# Patient Record
Sex: FEMALE | Race: WHITE
[De-identification: names, ages, dates, MRNs, and addresses within clinical notes are randomized per-mention and may not be internally consistent; named-entity substitution may affect disease eponyms.]

---

## 2020-11-03 ENCOUNTER — HOSPITAL ENCOUNTER (EMERGENCY)
Dept: HOSPITAL 8 - ED | Age: 55
Discharge: HOME | End: 2020-11-03
Payer: OTHER GOVERNMENT

## 2020-11-03 VITALS — SYSTOLIC BLOOD PRESSURE: 146 MMHG | DIASTOLIC BLOOD PRESSURE: 89 MMHG

## 2020-11-03 VITALS — HEIGHT: 66 IN | BODY MASS INDEX: 37.2 KG/M2 | WEIGHT: 231.49 LBS

## 2020-11-03 DIAGNOSIS — N30.01: Primary | ICD-10-CM

## 2020-11-03 LAB — MICROSCOPIC: (no result)

## 2020-11-03 PROCEDURE — 87186 SC STD MICRODIL/AGAR DIL: CPT

## 2020-11-03 PROCEDURE — 81001 URINALYSIS AUTO W/SCOPE: CPT

## 2020-11-03 PROCEDURE — 87086 URINE CULTURE/COLONY COUNT: CPT

## 2020-11-03 PROCEDURE — 99283 EMERGENCY DEPT VISIT LOW MDM: CPT

## 2020-11-03 PROCEDURE — 87077 CULTURE AEROBIC IDENTIFY: CPT

## 2020-11-03 NOTE — NUR
PT GIVEN PRESCRIPTION MEDICATION AT DISCHARGE. FOLLOW UP WITH UROLOGY. PT 
VERBALIZED UNDERSTANDING. AMBULATORY AT DISCHARGE.